# Patient Record
Sex: FEMALE | Race: WHITE
[De-identification: names, ages, dates, MRNs, and addresses within clinical notes are randomized per-mention and may not be internally consistent; named-entity substitution may affect disease eponyms.]

---

## 2019-09-05 ENCOUNTER — HOSPITAL ENCOUNTER (EMERGENCY)
Dept: HOSPITAL 41 - JD.ED | Age: 74
Discharge: HOME | End: 2019-09-05
Payer: MEDICARE

## 2019-09-05 VITALS — DIASTOLIC BLOOD PRESSURE: 66 MMHG | SYSTOLIC BLOOD PRESSURE: 122 MMHG

## 2019-09-05 DIAGNOSIS — Z79.82: ICD-10-CM

## 2019-09-05 DIAGNOSIS — R07.9: Primary | ICD-10-CM

## 2019-09-05 DIAGNOSIS — Z79.899: ICD-10-CM

## 2019-09-05 DIAGNOSIS — M54.6: ICD-10-CM

## 2019-09-05 DIAGNOSIS — I10: ICD-10-CM

## 2019-09-05 PROCEDURE — 93005 ELECTROCARDIOGRAM TRACING: CPT

## 2019-09-05 PROCEDURE — 71045 X-RAY EXAM CHEST 1 VIEW: CPT

## 2019-09-05 PROCEDURE — 82553 CREATINE MB FRACTION: CPT

## 2019-09-05 PROCEDURE — 80053 COMPREHEN METABOLIC PANEL: CPT

## 2019-09-05 PROCEDURE — 71275 CT ANGIOGRAPHY CHEST: CPT

## 2019-09-05 PROCEDURE — 84484 ASSAY OF TROPONIN QUANT: CPT

## 2019-09-05 PROCEDURE — 85730 THROMBOPLASTIN TIME PARTIAL: CPT

## 2019-09-05 PROCEDURE — 96360 HYDRATION IV INFUSION INIT: CPT

## 2019-09-05 PROCEDURE — 85610 PROTHROMBIN TIME: CPT

## 2019-09-05 PROCEDURE — 99285 EMERGENCY DEPT VISIT HI MDM: CPT

## 2019-09-05 PROCEDURE — 85025 COMPLETE CBC W/AUTO DIFF WBC: CPT

## 2019-09-05 PROCEDURE — 85379 FIBRIN DEGRADATION QUANT: CPT

## 2019-09-05 PROCEDURE — 36415 COLL VENOUS BLD VENIPUNCTURE: CPT

## 2019-09-05 RX ADMIN — Medication PRN ML: at 08:55

## 2019-09-05 RX ADMIN — Medication PRN ML: at 09:05

## 2019-09-05 NOTE — CT
CT chest

 

Technique: Multiple axial sections through the chest were obtained.  

Intravenous contrast was utilized.

 

Comparison: No prior CT chest study, previous chest x-ray performed 

earlier on the same day (9:21 AM)

 

Findings: No filling defects are identified to indicate pulmonary 

embolism.  

 

Slightly enlarged left lobe of the thyroid gland is seen with mild 

substernal extension which is most likely due to thyroid goiter.  

 

Atherosclerotic calcification is noted within the aorta with no 

aneurysm.  Slightly ectatic ascending aorta is seen with AP 

measurement of 3.6 cm.  Mediastinum shows small lymph nodes which are 

normal in size.  No hilar abnormalities are seen.  No pericardial 

effusion is seen.  

 

Small hiatal hernia is noted.  Visualized portions of the upper 

abdominal structures show no discrete abnormality.

 

No axillary adenopathy is seen.

 

Mild dependent atelectasis is seen.  Area of discoid atelectasis are 

seen within both lung bases.  Lungs otherwise are clear with nothing 

acute being seen within either lung.

 

Bone window settings were reviewed which showed no acute osseous 

abnormality.  Mild degenerative change is scattered within the spine. 

 Several compression deformities are seen within the mid and lower 

thoracic spine which are likely old.

 

Impression:

1.  No findings of pulmonary embolism.

2.  Other findings as noted above are believed to be incidental.  

Nothing acute is appreciated.

 

Diagnostic code #2

## 2019-09-05 NOTE — EDM.PDOC
ED HPI GENERAL MEDICAL PROBLEM





- General


Chief Complaint: Chest Pain


Stated Complaint: CHEST PAIN


Time Seen by Provider: 09/05/19 09:06





- History of Present Illness


INITIAL COMMENTS - FREE TEXT/NARRATIVE: 





74-year-old female presents emergency room with chest pain.





This pain has been going on for couple of days. The patient has chronic upper 

back pain but now this is extending into her chest. At this moment she does not 

have chest discomfort. This pain is triggered by doing things with her arms and 

it usually makes the back pain worse first. 2 days ago she had some discomfort 

with deep breathing but this seems to gotten better. Patient denies any fevers 

or chills. She has not had any recent illnesses. Patient does not have a 

history of coronary artery disease however she has a significant history of 

hypertension on multiple medications, and is treated for hyperlipidemia. No 

history of smoking or other illicit drug use. At times the patient has had some 

mild nausea with the chest discomfort however the chest discomfort is not 

brought on by walking. She has not had any radiating pain into her neck and jaw 

or arms.








  ** Chest


Pain Score (Numeric/FACES): 2





- Related Data


 Allergies











Allergy/AdvReac Type Severity Reaction Status Date / Time


 


No Known Allergies Allergy   Verified 09/05/19 08:46











Home Meds: 


 Home Meds





Atenolol [Tenormin] 100 mg PO DAILY 09/08/15 [History]


Calcium Carbonate/Vitamin D3 [Calcium 1,000 + D3 Caplet] 1 tab PO BID 09/08/15 [

History]


Cholecalciferol (Vitamin D3) [Vitamin D] 1,000 unit PO DAILY 09/08/15 [History]


Hydrochlorothiazide/Lisinopril [Lisinopril-HCTZ 20-25 MG] 2 tab PO DAILY 09/08/

15 [History]


Multivitamin [Daily Multiple Vitamin] 1 tab PO DAILY 09/08/15 [History]


Omeprazole 20 mg PO ACBRK 09/08/15 [History]


Simvastatin [Zocor] 10 mg PO BEDTIME 09/08/15 [History]


amLODIPine [Norvasc] 10 mg PO DAILY 09/08/15 [History]


Aspirin [Halfprin] 81 mg PO DAILY 09/05/19 [History]











Past Medical History


HEENT History: Reports: Cataract, Impaired Vision


Other HEENT History: wears eye glasses


Cardiovascular History: Reports: Hypertension


Gastrointestinal History: Reports: Other (See Below)


Other Gastrointestinal History: perferated bowel


OB/GYN History: Reports: Pregnancy


Musculoskeletal History: Reports: Fracture





- Infectious Disease History


Infectious Disease History: Reports: Chicken Pox, Mumps





- Past Surgical History


HEENT Surgical History: Reports: Cataract Surgery, Tonsillectomy


GI Surgical History: Reports: Other (See Below)


Other GI Surgeries/Procedures: repair of perf bowel





Social & Family History





- Tobacco Use


Smoking Status *Q: Never Smoker


Second Hand Smoke Exposure: No





- Caffeine Use


Caffeine Use: Reports: Coffee





- Recreational Drug Use


Recreational Drug Use: No





ED ROS GENERAL





- Review of Systems


Review Of Systems: See Below


Constitutional: Reports: No Symptoms


HEENT: Reports: No Symptoms


Respiratory: Reports: Pleuritic Chest Pain


Cardiovascular: Reports: Chest Pain (Worse with doing things with her arms)


GI/Abdominal: Reports: Nausea.  Denies: Abdominal Pain, Vomiting


: Reports: No Symptoms


Musculoskeletal: Reports: Back Pain


Skin: Reports: No Symptoms


Neurological: Reports: No Symptoms


Psychiatric: Reports: No Symptoms


Hematologic/Lymphatic: Reports: No Symptoms


Immunologic: Reports: No Symptoms





ED EXAM, GENERAL





- Physical Exam


Exam: See Below


Exam Limited By: No Limitations


General Appearance: Alert, No Apparent Distress


Head: Atraumatic, Normocephalic


Neck: Normal Inspection, Supple, Non-Tender, Full Range of Motion.  No: 

Lymphadenopathy (L), Lymphadenopathy (R)


Respiratory/Chest: No Respiratory Distress, Lungs Clear, Normal Breath Sounds


Cardiovascular: Regular Rate, Rhythm, No Edema, No Murmur


GI/Abdominal: Normal Bowel Sounds, Soft, Non-Tender


Back Exam: Other (She has some discomfort in the mid and upper thoracic back 

with some noticeable kyphotic changes)


Extremities: Normal Inspection, Non-Tender, No Pedal Edema


Neurological: Alert, Oriented, Normal Cognition


Psychiatric: Normal Affect, Normal Mood


Skin Exam: Warm, Dry, Intact





EKG INTERPRETATION


P-Wave: Present


QRS: Normal


ST-T: Other (Inverted T's in 3 and aVF minimal nondiagnostic ST depression in 

leads V4 and V5 and to a lesser degree V6)


QT: Normal


Comparison: NA - No Prior EKG





Course





- Vital Signs


Last Recorded V/S: 


 Last Vital Signs











Temp  36.1 C   09/05/19 08:40


 


Pulse  55 L  09/05/19 10:13


 


Resp  14   09/05/19 10:13


 


BP  116/65   09/05/19 10:13


 


Pulse Ox  98   09/05/19 10:13














- Orders/Labs/Meds


Orders: 


 Active Orders 24 hr











 Category Date Time Status


 


 EKG 12 Lead [EKG Documentation Completion] [RC] STAT Care  09/05/19 08:45 

Active


 


 Chest 1V Frontal [CR] Stat Exams  09/05/19 08:45 Taken


 


 Sodium Chloride 0.9% [Normal Saline] 100 ml Med  09/05/19 11:15 Active





 IV ASDIRECTED   


 


 Sodium Chloride 0.9% [Saline Flush] Med  09/05/19 08:47 Active





 10 ml FLUSH ASDIRECTED PRN   


 


 Saline Lock Insert [OM.PC] Routine Oth  09/05/19 08:47 Ordered








 Medication Orders





Sodium Chloride (Normal Saline)  100 mls @ 60 mls/hr IV ASDIRECTED AYIMA


   Last Admin: 09/05/19 11:42  Dose: 60 mls/hr


Sodium Chloride (Saline Flush)  10 ml FLUSH ASDIRECTED PRN


   PRN Reason: Keep Vein Open


   Last Admin: 09/05/19 09:05  Dose: 10 ml








Labs: 


 Laboratory Tests











  09/05/19 09/05/19 09/05/19 Range/Units





  09:02 09:02 09:02 


 


WBC  11.30 H    (3.98-10.04)  K/mm3


 


RBC  5.01    (3.98-5.22)  M/mm3


 


Hgb  14.1    (11.2-15.7)  gm/L


 


Hct  43.3    (34.1-44.9)  %


 


MCV  86.4    (79.4-94.8)  fl


 


MCH  28.1    (25.6-32.2)  pg


 


MCHC  32.6    (32.2-35.5)  g/dl


 


RDW Std Deviation  45.1    (36.4-46.3)  fL


 


Plt Count  186    (182-369)  K/mm3


 


MPV  11.5    (9.4-12.3)  fl


 


Neut % (Auto)  72.6 H    (34.0-71.1)  %


 


Lymph % (Auto)  15.4 L    (19.3-51.7)  %


 


Mono % (Auto)  7.3    (4.7-12.5)  %


 


Eos % (Auto)  4.2    (0.7-5.8)  


 


Baso % (Auto)  0.3    (0.1-1.2)  %


 


Neut # (Auto)  8.20 H    (1.56-6.13)  K/mm3


 


Lymph # (Auto)  1.74    (1.18-3.74)  K/mm3


 


Mono # (Auto)  0.83 H    (0.24-0.36)  K/mm3


 


Eos # (Auto)  0.48 H    (0.04-0.36)  K/mm3


 


Baso # (Auto)  0.03    (0.01-0.08)  K/mm3


 


Manual Slide Review  Not Reportable    


 


PT   10.2   (9.7-12.0)  SECONDS


 


INR   0.93   


 


APTT   25   (22-31)  SECONDS


 


D-Dimer, Quantitative     (0.19-0.50)  mg/L


 


Sodium    140  (136-145)  mEq/L


 


Potassium    4.0  (3.5-5.1)  mEq/L


 


Chloride    103  ()  mEq/L


 


Carbon Dioxide    26  (21-32)  mEq/L


 


Anion Gap    15.0  (5-15)  


 


BUN    33 H  (7-18)  mg/dL


 


Creatinine    1.3 H  (0.55-1.02)  mg/dL


 


Est Cr Clr Drug Dosing    32.78  mL/min


 


Estimated GFR (MDRD)    40  (>60)  mL/min


 


BUN/Creatinine Ratio    25.4 H  (14-18)  


 


Glucose    110  ()  mg/dL


 


Calcium    9.8  (8.5-10.1)  mg/dL


 


Total Bilirubin    0.6  (0.2-1.0)  mg/dL


 


AST    13 L  (15-37)  U/L


 


ALT    19  (14-59)  U/L


 


Alkaline Phosphatase    60  ()  U/L


 


CK-MB (CK-2)    1.0  (0-3.6)  ng/ml


 


Troponin I    < 0.017  (0.00-0.056)  ng/mL


 


Total Protein    7.2  (6.4-8.2)  g/dl


 


Albumin    4.1  (3.4-5.0)  g/dl


 


Globulin    3.1  gm/dL


 


Albumin/Globulin Ratio    1.3  (1-2)  














  09/05/19 Range/Units





  09:02 


 


WBC   (3.98-10.04)  K/mm3


 


RBC   (3.98-5.22)  M/mm3


 


Hgb   (11.2-15.7)  gm/L


 


Hct   (34.1-44.9)  %


 


MCV   (79.4-94.8)  fl


 


MCH   (25.6-32.2)  pg


 


MCHC   (32.2-35.5)  g/dl


 


RDW Std Deviation   (36.4-46.3)  fL


 


Plt Count   (182-369)  K/mm3


 


MPV   (9.4-12.3)  fl


 


Neut % (Auto)   (34.0-71.1)  %


 


Lymph % (Auto)   (19.3-51.7)  %


 


Mono % (Auto)   (4.7-12.5)  %


 


Eos % (Auto)   (0.7-5.8)  


 


Baso % (Auto)   (0.1-1.2)  %


 


Neut # (Auto)   (1.56-6.13)  K/mm3


 


Lymph # (Auto)   (1.18-3.74)  K/mm3


 


Mono # (Auto)   (0.24-0.36)  K/mm3


 


Eos # (Auto)   (0.04-0.36)  K/mm3


 


Baso # (Auto)   (0.01-0.08)  K/mm3


 


Manual Slide Review   


 


PT   (9.7-12.0)  SECONDS


 


INR   


 


APTT   (22-31)  SECONDS


 


D-Dimer, Quantitative  1.20 H  (0.19-0.50)  mg/L


 


Sodium   (136-145)  mEq/L


 


Potassium   (3.5-5.1)  mEq/L


 


Chloride   ()  mEq/L


 


Carbon Dioxide   (21-32)  mEq/L


 


Anion Gap   (5-15)  


 


BUN   (7-18)  mg/dL


 


Creatinine   (0.55-1.02)  mg/dL


 


Est Cr Clr Drug Dosing   mL/min


 


Estimated GFR (MDRD)   (>60)  mL/min


 


BUN/Creatinine Ratio   (14-18)  


 


Glucose   ()  mg/dL


 


Calcium   (8.5-10.1)  mg/dL


 


Total Bilirubin   (0.2-1.0)  mg/dL


 


AST   (15-37)  U/L


 


ALT   (14-59)  U/L


 


Alkaline Phosphatase   ()  U/L


 


CK-MB (CK-2)   (0-3.6)  ng/ml


 


Troponin I   (0.00-0.056)  ng/mL


 


Total Protein   (6.4-8.2)  g/dl


 


Albumin   (3.4-5.0)  g/dl


 


Globulin   gm/dL


 


Albumin/Globulin Ratio   (1-2)  











Meds: 


Medications











Generic Name Dose Route Start Last Admin





  Trade Name Romuloq  PRN Reason Stop Dose Admin


 


Sodium Chloride  100 mls @ 60 mls/hr  09/05/19 11:15  09/05/19 11:42





  Normal Saline  IV   60 mls/hr





  ASDIRECTED YAIMA   Administration





     





     





     





     


 


Sodium Chloride  10 ml  09/05/19 08:47  09/05/19 09:05





  Saline Flush  FLUSH   10 ml





  ASDIRECTED PRN   Administration





  Keep Vein Open   





     





     





     














Discontinued Medications














Generic Name Dose Route Start Last Admin





  Trade Name Freq  PRN Reason Stop Dose Admin


 


Aspirin  324 mg  09/05/19 09:15  09/05/19 09:25





  Aspirin  PO  09/05/19 09:16  324 mg





  ONETIME ONE   Administration





     





     





     





     


 


Sodium Chloride  1,000 mls @ 999 mls/hr  09/05/19 10:57  





  Normal Saline  IV  09/05/19 11:57  





  ONETIME ONE   





     





     





     





     


 


Iopamidol  100 ml  09/05/19 11:06  09/05/19 11:42





  Isovue-370 (76%)  IVPUSH  09/05/19 11:07  100 ml





  ONETIME ONE   Administration





     





     





     





     


 


Sodium Chloride  10 ml  09/05/19 11:06  09/05/19 11:42





  Saline Flush  FLUSH  09/05/19 11:07  10 ml





  ONETIME ONE   Administration





     





     





     





     














- Re-Assessments/Exams


Free Text/Narrative Re-Assessment/Exam: 





09/05/19 12:30


Laboratory evaluation was for the most part nondiagnostic except her d-dimer 

was elevated. We gave the patient 500 mL of an SN then sent her over for a CTA 

which was negative for blood clot in her lungs however she does have what looks 

like a little bit of a goiter on the left side of her thyroid. She's had a 

thyroid nodule removed in the past. At this point the patient would like to go 

home. With her pain being aggravated by her using her arms it probably is 

muscle skeletal in origin but at her age close follow-up is needed. I did offer 

to check a second troponin the patient declined this.





Departure





- Departure


Time of Disposition: 12:32


Disposition: Home, Self-Care 01


Clinical Impression: 


 Chest pain





Referrals: 


Ayana Solorzano, NP [Primary Care Provider] - 


Forms:  ED Department Discharge


Additional Instructions: 


Return to the emergency room with any questions problems worsening symptoms. 





Follow-up with your regular provider early next week discuss further thyroid 

testing. Also discussed any other heart testing that needs to be done. 





Continue your routine medications.





- My Orders


Last 24 Hours: 


My Active Orders





09/05/19 08:45


EKG 12 Lead [EKG Documentation Completion] [RC] STAT 


Chest 1V Frontal [CR] Stat 





09/05/19 08:47


Sodium Chloride 0.9% [Saline Flush]   10 ml FLUSH ASDIRECTED PRN 


Saline Lock Insert [OM.PC] Routine 





09/05/19 11:15


Sodium Chloride 0.9% [Normal Saline] 100 ml IV ASDIRECTED 














- Assessment/Plan


Last 24 Hours: 


My Active Orders





09/05/19 08:45


EKG 12 Lead [EKG Documentation Completion] [RC] STAT 


Chest 1V Frontal [CR] Stat 





09/05/19 08:47


Sodium Chloride 0.9% [Saline Flush]   10 ml FLUSH ASDIRECTED PRN 


Saline Lock Insert [OM.PC] Routine 





09/05/19 11:15


Sodium Chloride 0.9% [Normal Saline] 100 ml IV ASDIRECTED

## 2019-09-06 NOTE — CR
Chest: Portable view of the chest was obtained.

 

Comparison: No prior chest x-ray.

 

Heart is mildly enlarged.  Tortuous thoracic aorta is seen.  Lungs 

show mild left basilar atelectasis.  Lungs otherwise are clear.  Bony 

structures show nothing acute.

 

Impression:

1.  Mild left basilar atelectasis.

2.  Mildly enlarged heart.

3.  Nothing acute is otherwise seen on portable chest x-ray.

 

Diagnostic code #2

## 2020-07-06 ENCOUNTER — HOSPITAL ENCOUNTER (EMERGENCY)
Dept: HOSPITAL 41 - JD.ED | Age: 75
Discharge: HOME | End: 2020-07-06
Payer: MEDICARE

## 2020-07-06 VITALS — SYSTOLIC BLOOD PRESSURE: 136 MMHG | DIASTOLIC BLOOD PRESSURE: 67 MMHG | HEART RATE: 62 BPM

## 2020-07-06 DIAGNOSIS — I10: ICD-10-CM

## 2020-07-06 DIAGNOSIS — R07.89: Primary | ICD-10-CM

## 2020-07-06 DIAGNOSIS — Z79.82: ICD-10-CM

## 2020-07-06 DIAGNOSIS — Z79.899: ICD-10-CM

## 2020-07-06 PROCEDURE — 36415 COLL VENOUS BLD VENIPUNCTURE: CPT

## 2020-07-06 PROCEDURE — 85610 PROTHROMBIN TIME: CPT

## 2020-07-06 PROCEDURE — 84443 ASSAY THYROID STIM HORMONE: CPT

## 2020-07-06 PROCEDURE — 80053 COMPREHEN METABOLIC PANEL: CPT

## 2020-07-06 PROCEDURE — 99285 EMERGENCY DEPT VISIT HI MDM: CPT

## 2020-07-06 PROCEDURE — 85025 COMPLETE CBC W/AUTO DIFF WBC: CPT

## 2020-07-06 PROCEDURE — 71045 X-RAY EXAM CHEST 1 VIEW: CPT

## 2020-07-06 PROCEDURE — 93005 ELECTROCARDIOGRAM TRACING: CPT

## 2020-07-06 PROCEDURE — 85730 THROMBOPLASTIN TIME PARTIAL: CPT

## 2020-07-06 PROCEDURE — 84484 ASSAY OF TROPONIN QUANT: CPT

## 2020-07-06 NOTE — EDM.PDOC
ED HPI GENERAL MEDICAL PROBLEM





- General


Chief Complaint: Chest Pain


Stated Complaint: chest and back pain


Time Seen by Provider: 07/06/20 06:00





- History of Present Illness


INITIAL COMMENTS - FREE TEXT/NARRATIVE: 





75-year-old female presents the emergency room with chest pain.





This pain is been going on since Thursday, going on its fifth day.  This pain 

seems to be related to her back pain.  Patient is chronic back pain and when she

is really active and using her arms she sometimes gets chest pain.  The patient 

is noticed that when she picks up bird feed she develops this chest pain.  The 

patient does take a daily aspirin and is treated for hypertension, on several 

medications.  I did see this patient back in September of this last year for 

similar condition.  After that visit she did follow-up and have a stress Cardi

olite that was unrevealing.  At this time the patient is not having chest pain 

she is just having her typical back pain.  The patient uses Tylenol for her back

and when she remembers to take it this seems to help.  The patient is not having

any shortness of breath no nausea and vomiting.  The pain she describes seems to

be bandlike just below her breast.


  ** Epigastric


Pain Score (Numeric/FACES): 4





- Related Data


                                    Allergies











Allergy/AdvReac Type Severity Reaction Status Date / Time


 


No Known Allergies Allergy   Verified 07/06/20 06:12











Home Meds: 


                                    Home Meds





Calcium Carbonate/Vitamin D3 [Calcium 1,000 + D3 Caplet] 600 tab PO DAILY 

09/08/15 [History]


Cholecalciferol (Vitamin D3) [Vitamin D] 2,000 unit PO DAILY 09/08/15 [History]


Hydrochlorothiazide/Lisinopril [Lisinopril-HCTZ 20-25 MG] 2 tab PO DAILY 

09/08/15 [History]


Multivitamin [Daily Multiple Vitamin] 1 tab PO DAILY 09/08/15 [History]


Simvastatin [Zocor] 10 mg PO BEDTIME 09/08/15 [History]


amLODIPine [Norvasc] 10 mg PO DAILY 09/08/15 [History]


atenoloL [Tenormin] 100 mg PO DAILY 09/08/15 [History]


Aspirin [Halfprin] 81 mg PO DAILY 09/05/19 [History]


Acetaminophen [Tylenol Extra Strength] 1,000 mg PO DAILY PRN MDD 4200 07/06/20 

[History]


Ranitidine [Zantac] 150 mg PO BID 07/06/20 [History]











Past Medical History


HEENT History: Reports: Cataract, Impaired Vision


Other HEENT History: wears eye glasses


Cardiovascular History: Reports: Hypertension


Gastrointestinal History: Reports: Other (See Below)


Other Gastrointestinal History: perferated bowel


OB/GYN History: Reports: Pregnancy


Musculoskeletal History: Reports: Fracture





- Infectious Disease History


Infectious Disease History: Reports: Chicken Pox, Mumps





- Past Surgical History


HEENT Surgical History: Reports: Cataract Surgery, Tonsillectomy


GI Surgical History: Reports: Other (See Below)


Other GI Surgeries/Procedures: repair of perf bowel





Social & Family History





- Caffeine Use


Caffeine Use: Reports: Coffee





ED ROS GENERAL





- Review of Systems


Review Of Systems: See Below


Constitutional: Reports: No Symptoms


HEENT: Reports: No Symptoms


Respiratory: Reports: No Symptoms


Cardiovascular: Reports: Chest Pain.  Denies: Dyspnea on Exertion, Palpitations


Endocrine: Reports: No Symptoms


GI/Abdominal: Reports: No Symptoms


: Reports: No Symptoms


Musculoskeletal: Reports: Back Pain


Neurological: Reports: No Symptoms





ED EXAM, GENERAL





- Physical Exam


Exam: See Below


Exam Limited By: No Limitations


General Appearance: Alert, No Apparent Distress, Other (She is not actively 

having chest pain at this time)


Head: Atraumatic, Normocephalic


Neck: Thyromegaly (Nodules noted)


Respiratory/Chest: No Respiratory Distress, Lungs Clear, Normal Breath Sounds


Cardiovascular: Regular Rate, Rhythm, No Edema, No Murmur


GI/Abdominal: Normal Bowel Sounds, Soft, Non-Tender


Back Exam: Normal Inspection.  No: CVA Tenderness (L), CVA Tenderness (R)


Extremities: Normal Inspection, No Pedal Edema


Neurological: Alert, Oriented, Normal Cognition





EKG INTERPRETATION


EKG Date: 07/06/20


Rhythm: NSR


Axis: LAD-Left Axis Deviation


P-Wave: Present


QRS: Normal


ST-T: Other (Specific nondiagnostic changes largely unchanged from September 2 019)


QT: Normal


Comparison: Change From Previous EKG (Subtle changes most likely due to lead 

placement)





Course





- Vital Signs


Last Recorded V/S: 


                                Last Vital Signs











Temp  36.6 C   07/06/20 05:56


 


Pulse  62   07/06/20 05:56


 


Resp  23 H  07/06/20 05:56


 


BP  136/67   07/06/20 05:56


 


Pulse Ox  97   07/06/20 05:56














- Orders/Labs/Meds


Orders: 


                               Active Orders 24 hr











 Category Date Time Status


 


 EKG 12 Lead [EKG Documentation Completion] [RC] URGENT Care  07/06/20 05:50 

Active


 


 Chest 1V Frontal [CR] Stat Exams  07/06/20 06:15 Taken











Labs: 


                                Laboratory Tests











  07/06/20 07/06/20 07/06/20 Range/Units





  06:07 06:07 06:07 


 


WBC  9.18    (3.98-10.04)  K/mm3


 


RBC  5.29 H    (3.98-5.22)  M/mm3


 


Hgb  14.6    (11.2-15.7)  gm/dl


 


Hct  46.4 H    (34.1-44.9)  %


 


MCV  87.7    (79.4-94.8)  fl


 


MCH  27.6    (25.6-32.2)  pg


 


MCHC  31.5 L    (32.2-35.5)  g/dl


 


RDW Std Deviation  42.9    (36.4-46.3)  fL


 


Plt Count  209    (182-369)  K/mm3


 


MPV  11.4    (9.4-12.3)  fl


 


Neut % (Auto)  73.3 H    (34.0-71.1)  %


 


Lymph % (Auto)  16.4 L    (19.3-51.7)  %


 


Mono % (Auto)  8.5    (4.7-12.5)  %


 


Eos % (Auto)  1.2    (0.7-5.8)  


 


Baso % (Auto)  0.4    (0.1-1.2)  %


 


Neut # (Auto)  6.72 H    (1.56-6.13)  K/mm3


 


Lymph # (Auto)  1.51    (1.18-3.74)  K/mm3


 


Mono # (Auto)  0.78 H    (0.24-0.36)  K/mm3


 


Eos # (Auto)  0.11    (0.04-0.36)  K/mm3


 


Baso # (Auto)  0.04    (0.01-0.08)  K/mm3


 


PT   10.3   (9.7-12.0)  SECONDS


 


INR   0.94   


 


APTT   27   (22-31)  SECONDS


 


Sodium    141  (136-145)  mEq/L


 


Potassium    4.0  (3.5-5.1)  mEq/L


 


Chloride    103  ()  mEq/L


 


Carbon Dioxide    27  (21-32)  mEq/L


 


Anion Gap    15.0  (5-15)  


 


BUN    27 H  (7-18)  mg/dL


 


Creatinine    1.3 H  (0.55-1.02)  mg/dL


 


Est Cr Clr Drug Dosing    30.93  mL/min


 


Estimated GFR (MDRD)    40  (>60)  mL/min


 


BUN/Creatinine Ratio    20.8 H  (14-18)  


 


Glucose    107  ()  mg/dL


 


Calcium    10.2 H  (8.5-10.1)  mg/dL


 


Total Bilirubin    0.6  (0.2-1.0)  mg/dL


 


AST    17  (15-37)  U/L


 


ALT    29  (14-59)  U/L


 


Alkaline Phosphatase    69  ()  U/L


 


Troponin I    < 0.017  (0.00-0.056)  ng/mL


 


Total Protein    7.6  (6.4-8.2)  g/dl


 


Albumin    4.2  (3.4-5.0)  g/dl


 


Globulin    3.4  gm/dL


 


Albumin/Globulin Ratio    1.2  (1-2)  


 


TSH 3rd Generation     (0.358-3.74)  uIU/mL














  07/06/20 Range/Units





  06:07 


 


WBC   (3.98-10.04)  K/mm3


 


RBC   (3.98-5.22)  M/mm3


 


Hgb   (11.2-15.7)  gm/dl


 


Hct   (34.1-44.9)  %


 


MCV   (79.4-94.8)  fl


 


MCH   (25.6-32.2)  pg


 


MCHC   (32.2-35.5)  g/dl


 


RDW Std Deviation   (36.4-46.3)  fL


 


Plt Count   (182-369)  K/mm3


 


MPV   (9.4-12.3)  fl


 


Neut % (Auto)   (34.0-71.1)  %


 


Lymph % (Auto)   (19.3-51.7)  %


 


Mono % (Auto)   (4.7-12.5)  %


 


Eos % (Auto)   (0.7-5.8)  


 


Baso % (Auto)   (0.1-1.2)  %


 


Neut # (Auto)   (1.56-6.13)  K/mm3


 


Lymph # (Auto)   (1.18-3.74)  K/mm3


 


Mono # (Auto)   (0.24-0.36)  K/mm3


 


Eos # (Auto)   (0.04-0.36)  K/mm3


 


Baso # (Auto)   (0.01-0.08)  K/mm3


 


PT   (9.7-12.0)  SECONDS


 


INR   


 


APTT   (22-31)  SECONDS


 


Sodium   (136-145)  mEq/L


 


Potassium   (3.5-5.1)  mEq/L


 


Chloride   ()  mEq/L


 


Carbon Dioxide   (21-32)  mEq/L


 


Anion Gap   (5-15)  


 


BUN   (7-18)  mg/dL


 


Creatinine   (0.55-1.02)  mg/dL


 


Est Cr Clr Drug Dosing   mL/min


 


Estimated GFR (MDRD)   (>60)  mL/min


 


BUN/Creatinine Ratio   (14-18)  


 


Glucose   ()  mg/dL


 


Calcium   (8.5-10.1)  mg/dL


 


Total Bilirubin   (0.2-1.0)  mg/dL


 


AST   (15-37)  U/L


 


ALT   (14-59)  U/L


 


Alkaline Phosphatase   ()  U/L


 


Troponin I   (0.00-0.056)  ng/mL


 


Total Protein   (6.4-8.2)  g/dl


 


Albumin   (3.4-5.0)  g/dl


 


Globulin   gm/dL


 


Albumin/Globulin Ratio   (1-2)  


 


TSH 3rd Generation  0.958  (0.358-3.74)  uIU/mL











Meds: 


Medications














Discontinued Medications














Generic Name Dose Route Start Last Admin





  Trade Name Freq  PRN Reason Stop Dose Admin


 


Aspirin  324 mg  07/06/20 06:17  07/06/20 06:27





  Aspirin  PO  07/06/20 06:18  324 mg





  ONETIME ONE   Administration














- Re-Assessments/Exams


Free Text/Narrative Re-Assessment/Exam: 





07/06/20 07:00


Laboratory evaluation is unrevealing her troponin is negative.  Her creatinine 

is 1.3 but this is where it has been running.  I did find her stress test 

results from this last October and they are reassuring.  Her troponin is below 

our detectable limits at less than 0.017.  The patient would like to go home at 

this time and this is reasonable





Departure





- Departure


Time of Disposition: 07:01


Disposition: Home, Self-Care 01


Clinical Impression: 


 Non-cardiac chest pain





Referrals: 


Ayana Solorzano NP [Primary Care Provider] - 


Forms:  ED Department Discharge


Additional Instructions: 


Return to the emergency room with any questions problems or worsening symptoms.





Continue taking your current medications.





Follow-up with your regular healthcare provider as needed.





Sepsis Event Note (ED)





- Evaluation


Sepsis Screening Result: No Definite Risk





- Focused Exam


Vital Signs: 


                                   Vital Signs











  Temp Pulse Resp BP Pulse Ox


 


 07/06/20 05:56  36.6 C  62  23 H  136/67  97














- My Orders


Last 24 Hours: 


My Active Orders





07/06/20 05:50


EKG 12 Lead [EKG Documentation Completion] [RC] URGENT 





07/06/20 06:15


Chest 1V Frontal [CR] Stat 














- Assessment/Plan


Last 24 Hours: 


My Active Orders





07/06/20 05:50


EKG 12 Lead [EKG Documentation Completion] [RC] URGENT 





07/06/20 06:15


Chest 1V Frontal [CR] Stat

## 2020-07-06 NOTE — CR
Chest: Portable view of the chest was obtained.

 

Comparison: Prior chest x-ray of 09/05/19.

 

Mild linear scarring is noted within the left lung base.  Lungs 

otherwise are clear.  Heart size and mediastinum are normal.  Bony 

structures are grossly intact.

 

Impression:

1.  Mild scarring within the left lung base.

2.  Nothing acute is appreciated on portable chest x-ray.

 

Diagnostic code #2

 

This report was dictated in MDT